# Patient Record
Sex: MALE | Race: WHITE | NOT HISPANIC OR LATINO | Employment: OTHER | URBAN - NONMETROPOLITAN AREA
[De-identification: names, ages, dates, MRNs, and addresses within clinical notes are randomized per-mention and may not be internally consistent; named-entity substitution may affect disease eponyms.]

---

## 2023-10-31 PROBLEM — Z87.891 FORMER SMOKER: Status: ACTIVE | Noted: 2023-10-31

## 2023-10-31 PROBLEM — E78.5 HYPERLIPIDEMIA: Status: ACTIVE | Noted: 2023-10-31

## 2023-10-31 PROBLEM — I10 BENIGN ESSENTIAL HYPERTENSION: Status: ACTIVE | Noted: 2023-10-31

## 2023-10-31 PROBLEM — E66.812 CLASS 2 OBESITY WITH BODY MASS INDEX (BMI) OF 37.0 TO 37.9 IN ADULT: Status: ACTIVE | Noted: 2023-10-31

## 2023-10-31 PROBLEM — I48.21 PERMANENT ATRIAL FIBRILLATION (MULTI): Status: ACTIVE | Noted: 2023-10-31

## 2023-10-31 PROBLEM — E66.9 CLASS 2 OBESITY WITH BODY MASS INDEX (BMI) OF 37.0 TO 37.9 IN ADULT: Status: ACTIVE | Noted: 2023-10-31

## 2023-10-31 PROBLEM — M06.9 RHEUMATOID ARTHRITIS (MULTI): Status: ACTIVE | Noted: 2023-10-31

## 2023-10-31 PROBLEM — Z79.899 HIGH RISK MEDICATION USE: Status: ACTIVE | Noted: 2023-10-31

## 2023-10-31 RX ORDER — OMEPRAZOLE 20 MG/1
20 CAPSULE, DELAYED RELEASE ORAL 2 TIMES DAILY
COMMUNITY

## 2023-10-31 RX ORDER — TIZANIDINE HYDROCHLORIDE 2 MG/1
20 CAPSULE, GELATIN COATED ORAL DAILY
COMMUNITY
End: 2024-05-15 | Stop reason: ALTCHOICE

## 2023-10-31 RX ORDER — ACETAMINOPHEN 500 MG
1 TABLET ORAL DAILY
COMMUNITY

## 2023-10-31 RX ORDER — VALSARTAN 160 MG/1
1 TABLET ORAL DAILY
COMMUNITY

## 2023-10-31 RX ORDER — CHOLECALCIFEROL (VITAMIN D3) 25 MCG
1 TABLET ORAL DAILY
COMMUNITY
End: 2024-05-15 | Stop reason: ALTCHOICE

## 2023-10-31 RX ORDER — ZINC GLUCONATE 50 MG
1 TABLET ORAL DAILY
COMMUNITY

## 2023-10-31 RX ORDER — ATORVASTATIN CALCIUM 20 MG/1
1 TABLET, FILM COATED ORAL NIGHTLY
COMMUNITY
Start: 2022-02-04

## 2023-10-31 RX ORDER — PREDNISONE 2.5 MG/1
2.5 TABLET ORAL DAILY
COMMUNITY

## 2023-10-31 RX ORDER — ALENDRONATE SODIUM 70 MG/1
1 TABLET ORAL
COMMUNITY
Start: 2022-06-27

## 2023-10-31 RX ORDER — SERTRALINE HYDROCHLORIDE 25 MG/1
1 TABLET, FILM COATED ORAL DAILY
COMMUNITY

## 2023-10-31 RX ORDER — METOPROLOL SUCCINATE 25 MG/1
1 TABLET, EXTENDED RELEASE ORAL DAILY
COMMUNITY
End: 2023-12-04 | Stop reason: DRUGHIGH

## 2023-10-31 RX ORDER — HYDROXYCHLOROQUINE SULFATE 200 MG/1
1 TABLET, FILM COATED ORAL DAILY
COMMUNITY
Start: 2022-06-29

## 2023-11-01 ENCOUNTER — OFFICE VISIT (OUTPATIENT)
Dept: CARDIOLOGY | Facility: CLINIC | Age: 88
End: 2023-11-01
Payer: MEDICARE

## 2023-11-01 VITALS
BODY MASS INDEX: 37.05 KG/M2 | HEART RATE: 81 BPM | HEIGHT: 64 IN | SYSTOLIC BLOOD PRESSURE: 122 MMHG | WEIGHT: 217 LBS | DIASTOLIC BLOOD PRESSURE: 70 MMHG

## 2023-11-01 DIAGNOSIS — Z79.899 HIGH RISK MEDICATION USE: ICD-10-CM

## 2023-11-01 DIAGNOSIS — E78.5 HYPERLIPIDEMIA, UNSPECIFIED HYPERLIPIDEMIA TYPE: ICD-10-CM

## 2023-11-01 DIAGNOSIS — I48.21 PERMANENT ATRIAL FIBRILLATION (MULTI): ICD-10-CM

## 2023-11-01 DIAGNOSIS — I10 BENIGN ESSENTIAL HYPERTENSION: ICD-10-CM

## 2023-11-01 PROCEDURE — 1159F MED LIST DOCD IN RCRD: CPT | Performed by: INTERNAL MEDICINE

## 2023-11-01 PROCEDURE — 1036F TOBACCO NON-USER: CPT | Performed by: INTERNAL MEDICINE

## 2023-11-01 PROCEDURE — 3078F DIAST BP <80 MM HG: CPT | Performed by: INTERNAL MEDICINE

## 2023-11-01 PROCEDURE — 3074F SYST BP LT 130 MM HG: CPT | Performed by: INTERNAL MEDICINE

## 2023-11-01 PROCEDURE — 99214 OFFICE O/P EST MOD 30 MIN: CPT | Performed by: INTERNAL MEDICINE

## 2023-11-01 NOTE — PROGRESS NOTES
"Subjective   Diego Munoz is a 88 y.o. male       Chief Complaint    Follow-up          HPI   Patient is in the office for follow-up for the problems noted below.  He has done well since last visit with no admission to the hospital and no symptoms of dyspnea palpitations syncope or near syncope.  He has significant limitation on the right arm due to previous shoulder surgery.  Lab data from August 2023 were reviewed and are very satisfactory.  Apart from the irregular rhythm and his obesity his physical examination was unremarkable.    ASSESSMENT AND PLAN:      1. Permanent atrial fibrillation managed with rate control anticoagulation and remains asymptomatic. We will continue anticoagulation with Eliquis, which he has tolerated. CBC is acceptable  2. Hyperlipidemia, on statin therapy. Presently under control. No side effect of statin, lipid profile from August 2023 was reviewed with the patient and his family  3.  Class II obesity. Encouraged the patient to watch his caloric consumption in order to bring his BMI down.  4. High-risk medication with anticoagulants without any bleeding complications  5. Hypertension, currently under control on valsartan  6. Rheumatoid arthritis on hydroxychloroquine and prednisone managed by rheumatology at the [facility]  .  Rossy Dickson MD, [facility]   Review of Systems   All other systems reviewed and are negative.         Visit Vitals  /70 (BP Location: Left arm, Patient Position: Sitting)   Pulse 81   Ht 1.626 m (5' 4\")   Wt 98.4 kg (217 lb)   BMI 37.25 kg/m²   Smoking Status Former   BSA 2.11 m²        Objective   Physical Exam  Constitutional:       Appearance: Normal appearance. He is normal weight.   HENT:      Nose: Nose normal.   Neck:      Vascular: No carotid bruit.   Cardiovascular:      Rate and Rhythm: Normal rate. Rhythm irregular.      Pulses: Normal pulses.      Heart sounds: Normal heart sounds.   Pulmonary:      Effort: Pulmonary effort is normal. "   Abdominal:      General: Bowel sounds are normal.      Palpations: Abdomen is soft.   Genitourinary:     Rectum: Normal.   Musculoskeletal:         General: Normal range of motion.      Cervical back: Normal range of motion.      Right lower leg: No edema.      Left lower leg: No edema.   Skin:     General: Skin is warm and dry.   Neurological:      General: No focal deficit present.      Mental Status: He is alert.   Psychiatric:         Mood and Affect: Mood normal.         Behavior: Behavior normal.         Thought Content: Thought content normal.         Judgment: Judgment normal.         Current Medications    Current Outpatient Medications:     acetaminophen (ARTHRITIS PAIN RELIEVER ORAL), Take 2 tablets by mouth every 6 hours if needed., Disp: , Rfl:     alendronate (Fosamax) 70 mg tablet, Take 1 tablet (70 mg) by mouth 1 (one) time per week.  ON AN EMPTY STOMACH WITH A GLASS OF WATER NO NOT LIE DOWN FOR 60 MINUTES, Disp: , Rfl:     apixaban (Eliquis) 5 mg tablet, Take 1 tablet (5 mg) by mouth 2 times a day., Disp: , Rfl:     atorvastatin (Lipitor) 20 mg tablet, Take 1 tablet (20 mg) by mouth once daily at bedtime., Disp: , Rfl:     cholecalciferol (Vitamin D-3) 25 MCG (1000 UT) tablet, Take 1 tablet (25 mcg) by mouth once daily., Disp: , Rfl:     cholecalciferol (Vitamin D-3) 5,000 Units tablet, Take 1 tablet (5,000 Units) by mouth once daily., Disp: , Rfl:     docusate sodium (STOOL SOFTENER ORAL), Take 1 tablet by mouth once daily. As directed, Disp: , Rfl:     hydroxychloroquine (Plaquenil) 200 mg tablet, Take 1 tablet (200 mg) by mouth once daily., Disp: , Rfl:     magnesium oxide 500 mg capsule, Take 1 capsule (500 mg) by mouth once daily., Disp: , Rfl:     metoprolol succinate XL (Toprol-XL) 25 mg 24 hr tablet, Take 1 tablet (25 mg) by mouth once daily., Disp: , Rfl:     NON FORMULARY, Take 2 each by mouth once daily. Eye Vitamins TABS, Disp: , Rfl:     omeprazole 20 mg tablet,disintegrat, delay rel,  Take 1 tablet by mouth 2 times a day., Disp: , Rfl:     predniSONE (Deltasone) 10 mg tablet, Take 2.5 mg by mouth once daily., Disp: , Rfl:     sertraline (Zoloft) 25 mg tablet, Take 1 tablet (25 mg) by mouth once daily. AS DIRECTED, Disp: , Rfl:     tiZANidine (Zanaflex) 2 mg capsule, Take 10 capsules (20 mg) by mouth once daily., Disp: , Rfl:     valsartan (Diovan) 160 mg tablet, Take 1 tablet (160 mg) by mouth once daily., Disp: , Rfl:     zinc acetate 50 mg (zinc) capsule, Take 1 capsule by mouth once daily., Disp: , Rfl:                      Assessment/Plan   1. Permanent atrial fibrillation (CMS/HCC)        2. Benign essential hypertension        3. High risk medication use        4. Hyperlipidemia, unspecified hyperlipidemia type

## 2023-11-01 NOTE — PATIENT INSTRUCTIONS
Please bring all medicines, vitamins, and herbal supplements with you when you come to the office.    Prescriptions will not be filled unless you are compliant with your follow up appointments or have a follow up appointment scheduled as per instruction of your physician. Refills should be requested at the time of your visit.     Follow up 6 months

## 2023-12-04 ENCOUNTER — TELEPHONE (OUTPATIENT)
Dept: CARDIOLOGY | Facility: CLINIC | Age: 88
End: 2023-12-04
Payer: MEDICARE

## 2023-12-04 RX ORDER — METOPROLOL SUCCINATE 50 MG/1
50 TABLET, EXTENDED RELEASE ORAL DAILY
COMMUNITY
End: 2023-12-06

## 2023-12-04 NOTE — TELEPHONE ENCOUNTER
Wife called into office that patient went to see PCP office and they adjusted his blood pressure medications. At their office was 148/96, so they doubled his Metoprolol up to 50 mg daily.  They did this on Friday and wife wanted office to be aware and get opinion. Believes that his BP was raised due to some orthopedic problems he is having from a fall. Yesterday at home readings 128/81 (with increased dosage).    To Dr. Rossy Dickson MD for review

## 2023-12-05 DIAGNOSIS — I48.21 PERMANENT ATRIAL FIBRILLATION (MULTI): ICD-10-CM

## 2023-12-05 DIAGNOSIS — I10 BENIGN ESSENTIAL HYPERTENSION: ICD-10-CM

## 2023-12-06 RX ORDER — METOPROLOL SUCCINATE 50 MG/1
50 TABLET, EXTENDED RELEASE ORAL DAILY
Qty: 90 TABLET | Refills: 3 | Status: SHIPPED | OUTPATIENT
Start: 2023-12-06

## 2024-04-23 DIAGNOSIS — I48.21 PERMANENT ATRIAL FIBRILLATION (MULTI): ICD-10-CM

## 2024-05-15 ENCOUNTER — OFFICE VISIT (OUTPATIENT)
Dept: CARDIOLOGY | Facility: CLINIC | Age: 89
End: 2024-05-15
Payer: MEDICARE

## 2024-05-15 VITALS
WEIGHT: 223 LBS | HEIGHT: 64 IN | BODY MASS INDEX: 38.07 KG/M2 | DIASTOLIC BLOOD PRESSURE: 80 MMHG | HEART RATE: 60 BPM | SYSTOLIC BLOOD PRESSURE: 132 MMHG

## 2024-05-15 DIAGNOSIS — I10 BENIGN ESSENTIAL HYPERTENSION: ICD-10-CM

## 2024-05-15 DIAGNOSIS — I48.21 PERMANENT ATRIAL FIBRILLATION (MULTI): ICD-10-CM

## 2024-05-15 DIAGNOSIS — E66.9 CLASS 2 OBESITY WITH BODY MASS INDEX (BMI) OF 38.0 TO 38.9 IN ADULT, UNSPECIFIED OBESITY TYPE, UNSPECIFIED WHETHER SERIOUS COMORBIDITY PRESENT: ICD-10-CM

## 2024-05-15 DIAGNOSIS — Z79.899 HIGH RISK MEDICATION USE: ICD-10-CM

## 2024-05-15 DIAGNOSIS — Z87.891 FORMER SMOKER: ICD-10-CM

## 2024-05-15 DIAGNOSIS — E78.5 HYPERLIPIDEMIA, UNSPECIFIED HYPERLIPIDEMIA TYPE: ICD-10-CM

## 2024-05-15 PROCEDURE — 1036F TOBACCO NON-USER: CPT | Performed by: INTERNAL MEDICINE

## 2024-05-15 PROCEDURE — 99214 OFFICE O/P EST MOD 30 MIN: CPT | Performed by: INTERNAL MEDICINE

## 2024-05-15 PROCEDURE — 3079F DIAST BP 80-89 MM HG: CPT | Performed by: INTERNAL MEDICINE

## 2024-05-15 PROCEDURE — 3075F SYST BP GE 130 - 139MM HG: CPT | Performed by: INTERNAL MEDICINE

## 2024-05-15 PROCEDURE — 1159F MED LIST DOCD IN RCRD: CPT | Performed by: INTERNAL MEDICINE

## 2024-05-15 NOTE — PATIENT INSTRUCTIONS
Please bring all medicines, vitamins, and herbal supplements with you when you come to the office.    Prescriptions will not be filled unless you are compliant with your follow up appointments or have a follow up appointment scheduled as per instruction of your physician. Refills should be requested at the time of your visit.     Fall Prevention Education Given     BMI was above normal measurement. Current weight: 101 kg (223 lb)  Weight change since last visit (-) denotes wt loss 6 lbs   Weight loss needed to achieve BMI 25: 77.7 Lbs  Weight loss needed to achieve BMI 30: 48.6 Lbs  Provided instructions on dietary changes.      Follow up 6 months  Same meds

## 2024-05-15 NOTE — LETTER
May 15, 2024     Evelia Puga DO  [address]  [place] [address]    Patient: Diego Munoz   YOB: 1935   Date of Visit: 5/15/2024       Dear Dr. Evelia Puga DO:    Thank you for referring Diego Munoz to me for evaluation. Below are my notes for this consultation.  If you have questions, please do not hesitate to call me. I look forward to following your patient along with you.       Sincerely,     Rossy Dickson MD      CC: No Recipients  ______________________________________________________________________________________    Subjective   Diego Munoz is a 88 y.o. male       Chief Complaint    Follow-up          HPI   Patient is in the office for follow-up for permanent atrial fibrillation accompanied by his wife.  He denies any symptoms of palpitations dyspnea chest pain syncope or near syncope and has had no major bleeds on apixaban except for intermittent nosebleed due to dryness and also ecchymosis from working around the house.  His pressure is under control.  He maintains active lifestyle.  His recent lab data were reviewed and his numbers look excellent.  Apart from the irregular rhythm and class II obesity examination was unremarkable.    ASSESSMENT AND PLAN:      1. Permanent atrial fibrillation managed with rate control anticoagulation and remains asymptomatic. We will continue anticoagulation with Eliquis, which he has tolerated. CBC is acceptable, renal function is normal  2. Hyperlipidemia, on statin therapy. Presently under control.  Recent lab data were reviewed and his numbers look on target  3.  Class II obesity. Encouraged the patient to watch his caloric consumption in order to bring his BMI down.  4. High-risk medication with anticoagulants without any major bleeding complications, he has intermittent ecchymosis easy to manage and requiring no packing  5. Hypertension, currently under control on valsartan, renal function is normal  6.  "Rheumatoid arthritis on hydroxychloroquine and prednisone managed by rheumatology at the [facility]  .  Rossy Dickson MD, [facility]   Review of Systems   All other systems reviewed and are negative.           Vitals:    05/15/24 0916   BP: 132/80   BP Location: Right arm   Patient Position: Sitting   Pulse: 60   Weight: 101 kg (223 lb)   Height: 1.626 m (5' 4\")        Objective   Physical Exam  Constitutional:       Appearance: Normal appearance.   HENT:      Nose: Nose normal.   Neck:      Vascular: No carotid bruit.   Cardiovascular:      Rate and Rhythm: Normal rate. Rhythm irregular.      Pulses: Normal pulses.      Heart sounds: Murmur heard.      Systolic murmur is present with a grade of 1/6.   Pulmonary:      Effort: Pulmonary effort is normal.   Abdominal:      General: Bowel sounds are normal.      Palpations: Abdomen is soft.   Musculoskeletal:         General: Normal range of motion.      Cervical back: Normal range of motion.      Right lower leg: No edema.      Left lower leg: No edema.   Skin:     General: Skin is warm and dry.   Neurological:      General: No focal deficit present.      Mental Status: He is alert.   Psychiatric:         Mood and Affect: Mood normal.         Behavior: Behavior normal.         Thought Content: Thought content normal.         Judgment: Judgment normal.         Allergies  Nitroglycerin     Current Medications    Current Outpatient Medications:   •  acetaminophen (ARTHRITIS PAIN RELIEVER ORAL), Take 2 tablets by mouth every 6 hours if needed., Disp: , Rfl:   •  alendronate (Fosamax) 70 mg tablet, Take 1 tablet (70 mg) by mouth 1 (one) time per week.  ON AN EMPTY STOMACH WITH A GLASS OF WATER NO NOT LIE DOWN FOR 60 MINUTES, Disp: , Rfl:   •  apixaban (Eliquis) 5 mg tablet, Take 1 tablet (5 mg) by mouth 2 times a day., Disp: 180 tablet, Rfl: 3  •  atorvastatin (Lipitor) 20 mg tablet, Take 1 tablet (20 mg) by mouth once daily at bedtime., Disp: , Rfl:   •  " cholecalciferol (Vitamin D-3) 5,000 Units tablet, Take 1 tablet (5,000 Units) by mouth once daily., Disp: , Rfl:   •  docusate sodium (STOOL SOFTENER ORAL), Take 1 tablet by mouth once daily. As directed, Disp: , Rfl:   •  hydroxychloroquine (Plaquenil) 200 mg tablet, Take 1 tablet (200 mg) by mouth once daily., Disp: , Rfl:   •  magnesium oxide 500 mg capsule, Take 1 capsule (500 mg) by mouth once daily., Disp: , Rfl:   •  metoprolol succinate XL (Toprol-XL) 50 mg 24 hr tablet, Take 1 tablet (50 mg) by mouth once daily., Disp: 90 tablet, Rfl: 3  •  NON FORMULARY, Take 2 each by mouth once daily. Eye Vitamins TABS, Disp: , Rfl:   •  omeprazole (PriLOSEC) 20 mg DR capsule, Take 1 capsule (20 mg) by mouth 2 times a day., Disp: , Rfl:   •  predniSONE (Deltasone) 2.5 mg tablet, Take 1 tablet (2.5 mg) by mouth once daily., Disp: , Rfl:   •  sertraline (Zoloft) 25 mg tablet, Take 1 tablet (25 mg) by mouth once daily. AS DIRECTED, Disp: , Rfl:   •  valsartan (Diovan) 160 mg tablet, Take 1 tablet (160 mg) by mouth once daily., Disp: , Rfl:   •  zinc acetate 50 mg (zinc) capsule, Take 1 capsule by mouth once daily., Disp: , Rfl:                      Assessment/Plan   1. Permanent atrial fibrillation (Multi)  Follow Up In Cardiology    Follow Up In Cardiology      2. Benign essential hypertension  Follow Up In Cardiology      3. Hyperlipidemia, unspecified hyperlipidemia type        4. High risk medication use        5. Former smoker        6. Class 2 obesity with body mass index (BMI) of 38.0 to 38.9 in adult, unspecified obesity type, unspecified whether serious comorbidity present                 Scribe Attestation  By signing my name below, Leticia SINGER LPN, Scribe   attest that this documentation has been prepared under the direction and in the presence of Rossy Dickson MD.     Provider Attestation - Scribe documentation    All medical record entries made by the Scribe were at my direction and personally dictated by  me. I have reviewed the chart and agree that the record accurately reflects my personal performance of the history, physical exam, discussion and plan.

## 2024-05-15 NOTE — PROGRESS NOTES
"Subjective   Diego Munoz is a 88 y.o. male       Chief Complaint    Follow-up          HPI   Patient is in the office for follow-up for permanent atrial fibrillation accompanied by his wife.  He denies any symptoms of palpitations dyspnea chest pain syncope or near syncope and has had no major bleeds on apixaban except for intermittent nosebleed due to dryness and also ecchymosis from working around the house.  His pressure is under control.  He maintains active lifestyle.  His recent lab data were reviewed and his numbers look excellent.  Apart from the irregular rhythm and class II obesity examination was unremarkable.    ASSESSMENT AND PLAN:      1. Permanent atrial fibrillation managed with rate control anticoagulation and remains asymptomatic. We will continue anticoagulation with Eliquis, which he has tolerated. CBC is acceptable, renal function is normal  2. Hyperlipidemia, on statin therapy. Presently under control.  Recent lab data were reviewed and his numbers look on target  3.  Class II obesity. Encouraged the patient to watch his caloric consumption in order to bring his BMI down.  4. High-risk medication with anticoagulants without any major bleeding complications, he has intermittent ecchymosis easy to manage and requiring no packing  5. Hypertension, currently under control on valsartan, renal function is normal  6. Rheumatoid arthritis on hydroxychloroquine and prednisone managed by rheumatology at the [facility]  .  [facility] Dickson MD, [facility]   Review of Systems   All other systems reviewed and are negative.           Vitals:    05/15/24 0916   BP: 132/80   BP Location: Right arm   Patient Position: Sitting   Pulse: 60   Weight: 101 kg (223 lb)   Height: 1.626 m (5' 4\")        Objective   Physical Exam  Constitutional:       Appearance: Normal appearance.   HENT:      Nose: Nose normal.   Neck:      Vascular: No carotid bruit.   Cardiovascular:      Rate and Rhythm: Normal rate. Rhythm " irregular.      Pulses: Normal pulses.      Heart sounds: Murmur heard.      Systolic murmur is present with a grade of 1/6.   Pulmonary:      Effort: Pulmonary effort is normal.   Abdominal:      General: Bowel sounds are normal.      Palpations: Abdomen is soft.   Musculoskeletal:         General: Normal range of motion.      Cervical back: Normal range of motion.      Right lower leg: No edema.      Left lower leg: No edema.   Skin:     General: Skin is warm and dry.   Neurological:      General: No focal deficit present.      Mental Status: He is alert.   Psychiatric:         Mood and Affect: Mood normal.         Behavior: Behavior normal.         Thought Content: Thought content normal.         Judgment: Judgment normal.         Allergies  Nitroglycerin     Current Medications    Current Outpatient Medications:     acetaminophen (ARTHRITIS PAIN RELIEVER ORAL), Take 2 tablets by mouth every 6 hours if needed., Disp: , Rfl:     alendronate (Fosamax) 70 mg tablet, Take 1 tablet (70 mg) by mouth 1 (one) time per week.  ON AN EMPTY STOMACH WITH A GLASS OF WATER NO NOT LIE DOWN FOR 60 MINUTES, Disp: , Rfl:     apixaban (Eliquis) 5 mg tablet, Take 1 tablet (5 mg) by mouth 2 times a day., Disp: 180 tablet, Rfl: 3    atorvastatin (Lipitor) 20 mg tablet, Take 1 tablet (20 mg) by mouth once daily at bedtime., Disp: , Rfl:     cholecalciferol (Vitamin D-3) 5,000 Units tablet, Take 1 tablet (5,000 Units) by mouth once daily., Disp: , Rfl:     docusate sodium (STOOL SOFTENER ORAL), Take 1 tablet by mouth once daily. As directed, Disp: , Rfl:     hydroxychloroquine (Plaquenil) 200 mg tablet, Take 1 tablet (200 mg) by mouth once daily., Disp: , Rfl:     magnesium oxide 500 mg capsule, Take 1 capsule (500 mg) by mouth once daily., Disp: , Rfl:     metoprolol succinate XL (Toprol-XL) 50 mg 24 hr tablet, Take 1 tablet (50 mg) by mouth once daily., Disp: 90 tablet, Rfl: 3    NON FORMULARY, Take 2 each by mouth once daily. Eye  Vitamins TABS, Disp: , Rfl:     omeprazole (PriLOSEC) 20 mg DR capsule, Take 1 capsule (20 mg) by mouth 2 times a day., Disp: , Rfl:     predniSONE (Deltasone) 2.5 mg tablet, Take 1 tablet (2.5 mg) by mouth once daily., Disp: , Rfl:     sertraline (Zoloft) 25 mg tablet, Take 1 tablet (25 mg) by mouth once daily. AS DIRECTED, Disp: , Rfl:     valsartan (Diovan) 160 mg tablet, Take 1 tablet (160 mg) by mouth once daily., Disp: , Rfl:     zinc acetate 50 mg (zinc) capsule, Take 1 capsule by mouth once daily., Disp: , Rfl:                      Assessment/Plan   1. Permanent atrial fibrillation (Multi)  Follow Up In Cardiology    Follow Up In Cardiology      2. Benign essential hypertension  Follow Up In Cardiology      3. Hyperlipidemia, unspecified hyperlipidemia type        4. High risk medication use        5. Former smoker        6. Class 2 obesity with body mass index (BMI) of 38.0 to 38.9 in adult, unspecified obesity type, unspecified whether serious comorbidity present                 Scribe Attestation  By signing my name below, Leticia SINGER LPN, Scribe   attest that this documentation has been prepared under the direction and in the presence of Rossy Dickson MD.     Provider Attestation - Scribe documentation    All medical record entries made by the Scribe were at my direction and personally dictated by me. I have reviewed the chart and agree that the record accurately reflects my personal performance of the history, physical exam, discussion and plan.

## 2024-11-01 ENCOUNTER — APPOINTMENT (OUTPATIENT)
Dept: CARDIOLOGY | Facility: CLINIC | Age: 89
End: 2024-11-01
Payer: MEDICARE

## 2024-11-01 VITALS
BODY MASS INDEX: 38 KG/M2 | DIASTOLIC BLOOD PRESSURE: 76 MMHG | HEART RATE: 62 BPM | HEIGHT: 64 IN | SYSTOLIC BLOOD PRESSURE: 118 MMHG | WEIGHT: 222.6 LBS

## 2024-11-01 DIAGNOSIS — R01.1 MURMUR, HEART: ICD-10-CM

## 2024-11-01 DIAGNOSIS — I35.0 NONRHEUMATIC AORTIC VALVE STENOSIS: ICD-10-CM

## 2024-11-01 DIAGNOSIS — Z87.891 FORMER SMOKER: ICD-10-CM

## 2024-11-01 DIAGNOSIS — E66.812 CLASS 2 OBESITY: ICD-10-CM

## 2024-11-01 DIAGNOSIS — E78.5 HYPERLIPIDEMIA, UNSPECIFIED HYPERLIPIDEMIA TYPE: ICD-10-CM

## 2024-11-01 DIAGNOSIS — I10 BENIGN ESSENTIAL HYPERTENSION: ICD-10-CM

## 2024-11-01 DIAGNOSIS — Z79.899 HIGH RISK MEDICATION USE: ICD-10-CM

## 2024-11-01 DIAGNOSIS — I48.21 PERMANENT ATRIAL FIBRILLATION (MULTI): Primary | ICD-10-CM

## 2024-11-01 PROCEDURE — 1159F MED LIST DOCD IN RCRD: CPT | Performed by: INTERNAL MEDICINE

## 2024-11-01 PROCEDURE — 99214 OFFICE O/P EST MOD 30 MIN: CPT | Performed by: INTERNAL MEDICINE

## 2024-11-01 PROCEDURE — 3074F SYST BP LT 130 MM HG: CPT | Performed by: INTERNAL MEDICINE

## 2024-11-01 PROCEDURE — 3078F DIAST BP <80 MM HG: CPT | Performed by: INTERNAL MEDICINE

## 2024-11-01 PROCEDURE — 1036F TOBACCO NON-USER: CPT | Performed by: INTERNAL MEDICINE

## 2024-11-16 ENCOUNTER — HOSPITAL ENCOUNTER (OUTPATIENT)
Dept: CARDIOLOGY | Facility: CLINIC | Age: 89
Discharge: HOME | End: 2024-11-16
Payer: MEDICARE

## 2024-11-16 VITALS
SYSTOLIC BLOOD PRESSURE: 126 MMHG | HEIGHT: 64 IN | WEIGHT: 222 LBS | DIASTOLIC BLOOD PRESSURE: 76 MMHG | BODY MASS INDEX: 37.9 KG/M2

## 2024-11-16 DIAGNOSIS — R01.1 MURMUR, HEART: ICD-10-CM

## 2024-11-16 LAB — BODY SURFACE AREA: 2.13 M2

## 2024-11-16 PROCEDURE — 93306 TTE W/DOPPLER COMPLETE: CPT | Performed by: INTERNAL MEDICINE

## 2024-11-16 PROCEDURE — 93306 TTE W/DOPPLER COMPLETE: CPT

## 2024-11-21 ENCOUNTER — TELEPHONE (OUTPATIENT)
Dept: CARDIOLOGY | Facility: CLINIC | Age: 89
End: 2024-11-21
Payer: MEDICARE

## 2024-11-21 LAB
AORTIC VALVE MEAN GRADIENT: 10 MMHG
AORTIC VALVE PEAK VELOCITY: 2.34 M/S
AV PEAK GRADIENT: 22 MMHG
AVA (PEAK VEL): 1.6 CM2
AVA (VTI): 1.56 CM2
EJECTION FRACTION: 63 %
LEFT VENTRICLE INTERNAL DIMENSION DIASTOLE: 4.82 CM (ref 3.5–6)
LEFT VENTRICULAR OUTFLOW TRACT DIAMETER: 2.6 CM
RIGHT VENTRICLE PEAK SYSTOLIC PRESSURE: 50.6 MMHG

## 2024-11-21 NOTE — TELEPHONE ENCOUNTER
----- Message from Rossy Dickson sent at 11/21/2024  1:21 PM EST -----  Let him know the cardiac murmur noted during the office visit is nothing to worry about  ----- Message -----  From: Latosha Syngo - Cardiology Results In  Sent: 11/21/2024  12:49 PM EST  To: Rossy Dickson MD

## 2025-01-02 DIAGNOSIS — I10 BENIGN ESSENTIAL HYPERTENSION: ICD-10-CM

## 2025-01-02 DIAGNOSIS — I48.21 PERMANENT ATRIAL FIBRILLATION (MULTI): ICD-10-CM

## 2025-01-02 DIAGNOSIS — E78.5 HYPERLIPIDEMIA, UNSPECIFIED HYPERLIPIDEMIA TYPE: ICD-10-CM

## 2025-01-02 RX ORDER — VALSARTAN 160 MG/1
160 TABLET ORAL DAILY
Qty: 90 TABLET | Refills: 3 | Status: SHIPPED | OUTPATIENT
Start: 2025-01-02 | End: 2026-01-02

## 2025-01-02 RX ORDER — ATORVASTATIN CALCIUM 20 MG/1
20 TABLET, FILM COATED ORAL NIGHTLY
Qty: 90 TABLET | Refills: 3 | Status: SHIPPED | OUTPATIENT
Start: 2025-01-02 | End: 2026-01-02

## 2025-01-02 RX ORDER — METOPROLOL SUCCINATE 50 MG/1
50 TABLET, EXTENDED RELEASE ORAL DAILY
Qty: 90 TABLET | Refills: 3 | Status: SHIPPED | OUTPATIENT
Start: 2025-01-02

## 2025-07-14 ENCOUNTER — TELEPHONE (OUTPATIENT)
Dept: CARDIOLOGY | Facility: CLINIC | Age: OVER 89
End: 2025-07-14
Payer: MEDICARE

## 2025-07-14 NOTE — TELEPHONE ENCOUNTER
Spouse phones and states that patient has been experiencing loose stools. They are inquiring if magnesium 400 mg is too high of a dose or if there is an alternate recommendation. Please advise

## 2025-07-15 NOTE — TELEPHONE ENCOUNTER
Attempted to phone spouse, left detailed message with information and instructions to return call.

## 2025-08-08 ENCOUNTER — APPOINTMENT (OUTPATIENT)
Dept: CARDIOLOGY | Facility: CLINIC | Age: OVER 89
End: 2025-08-08
Payer: MEDICARE

## 2025-08-08 VITALS
WEIGHT: 232.8 LBS | HEART RATE: 76 BPM | HEIGHT: 64 IN | SYSTOLIC BLOOD PRESSURE: 126 MMHG | BODY MASS INDEX: 39.75 KG/M2 | DIASTOLIC BLOOD PRESSURE: 86 MMHG

## 2025-08-08 DIAGNOSIS — I48.21 PERMANENT ATRIAL FIBRILLATION (MULTI): Primary | ICD-10-CM

## 2025-08-08 DIAGNOSIS — D64.9 NORMOCYTIC ANEMIA: ICD-10-CM

## 2025-08-08 DIAGNOSIS — Z79.899 HIGH RISK MEDICATION USE: ICD-10-CM

## 2025-08-08 DIAGNOSIS — Z87.891 FORMER SMOKER: ICD-10-CM

## 2025-08-08 DIAGNOSIS — I10 BENIGN ESSENTIAL HYPERTENSION: ICD-10-CM

## 2025-08-08 DIAGNOSIS — E87.1 HYPONATREMIA: ICD-10-CM

## 2025-08-08 DIAGNOSIS — E66.9 OBESITY (BMI 30-39.9): ICD-10-CM

## 2025-08-08 DIAGNOSIS — M06.9 RHEUMATOID ARTHRITIS, INVOLVING UNSPECIFIED SITE, UNSPECIFIED WHETHER RHEUMATOID FACTOR PRESENT (MULTI): ICD-10-CM

## 2025-08-08 DIAGNOSIS — E78.5 HYPERLIPIDEMIA, UNSPECIFIED HYPERLIPIDEMIA TYPE: ICD-10-CM

## 2025-08-08 PROBLEM — E66.812 CLASS 2 OBESITY WITH BODY MASS INDEX (BMI) OF 38.0 TO 38.9 IN ADULT: Status: RESOLVED | Noted: 2023-10-31 | Resolved: 2025-08-08

## 2025-08-08 LAB
NON-UH HIE AGAP: 11 MEQ/L (ref 6–16)
NON-UH HIE BUN/CREAT RATIO: 21 NO UNITS (ref 10–20)
NON-UH HIE BUN: 19 MG/DL (ref 5–21)
NON-UH HIE CALCIUM LVL: 9.3 MG/DL (ref 8.9–11.1)
NON-UH HIE CHLORIDE: 98 MMOL/L (ref 101–111)
NON-UH HIE CO2: 28 MMOL/L (ref 21–31)
NON-UH HIE CREATININE: 0.9 MG/DL (ref 0.5–1.3)
NON-UH HIE EGFR: 81 ML/MIN/1.73 M2
NON-UH HIE GLUCOSE LVL: 100 MG/DL (ref 55–199)
NON-UH HIE POTASSIUM LVL: 4.3 MMOL/L (ref 3.5–5.3)
NON-UH HIE SODIUM LVL: 133 MMOL/L (ref 135–145)

## 2025-08-08 PROCEDURE — 1159F MED LIST DOCD IN RCRD: CPT | Performed by: INTERNAL MEDICINE

## 2025-08-08 PROCEDURE — 3074F SYST BP LT 130 MM HG: CPT | Performed by: INTERNAL MEDICINE

## 2025-08-08 PROCEDURE — G2211 COMPLEX E/M VISIT ADD ON: HCPCS | Performed by: INTERNAL MEDICINE

## 2025-08-08 PROCEDURE — 3079F DIAST BP 80-89 MM HG: CPT | Performed by: INTERNAL MEDICINE

## 2025-08-08 PROCEDURE — 99214 OFFICE O/P EST MOD 30 MIN: CPT | Performed by: INTERNAL MEDICINE

## 2025-08-08 RX ORDER — FERROUS SULFATE 325(65) MG
325 TABLET ORAL
COMMUNITY
Start: 2025-04-01

## 2025-08-08 RX ORDER — PREDNISONE 2.5 MG/1
2.5 TABLET ORAL
COMMUNITY
Start: 2025-01-29

## 2025-08-08 NOTE — PATIENT INSTRUCTIONS
Please bring all medicines, vitamins, and herbal supplements with you when you come to the office.    Prescriptions will not be filled unless you are compliant with your follow up appointments or have a follow up appointment scheduled as per instruction of your physician. Refills should be requested at the time of your visit. BMI was above normal measurement. Current weight: 106 kg (232 lb 12.8 oz)  Weight change since last visit (-) denotes wt loss 10.8 lbs   Weight loss needed to achieve BMI 25: 87.5 Lbs  Weight loss needed to achieve BMI 30: 58.4 Lbs  Provided instructions on dietary changes.

## 2025-08-08 NOTE — PROGRESS NOTES
HPI  Patient is in the office today accompanied by his daughter for follow-up for permanent atrial fibrillation along with hypertension hyperlipidemia and mild aortic stenosis.  Since he was last seen in the office his weight has gone up by 10 pounds due to inactivity's and normal eating habits.  This issue was tackled and addressed at length with the patient and his daughter.  He had an echocardiogram since he was last seen in the office which revealed normal ejection fraction and mild aortic stenosis unchanged from previous echo in 2021.  His most recent lab data covering comprehensive metabolic profile CBC and lipid profile in March 2025 was reviewed, his sodium is low at 131 mmol/L.  Renal function is normal.  CBC revealed mild anemia with normal MCV.  He is on iron supplement.  No active bleeding has been encountered.  Apart from the irregular rhythm, aortic stenosis murmur and his obesity physical examination was normal.    ASSESSMENT AND PLAN:      1. Permanent atrial fibrillation managed with rate control anticoagulation and remains asymptomatic. We will continue anticoagulation with Eliquis, which he has tolerated.  CBC and renal functions are normal  2. Hyperlipidemia, on statin therapy. Presently under control.  March 2025 lab data were reviewed and his numbers look on target  3.  Class II obesity.  His weight has increased 10 pounds from last visit due to inactivity and normal eating habits.  Encouraged the patient to watch his caloric consumption in order to bring his BMI down.  4. High-risk medication with anticoagulants using Eliquis without any major bleeding complications but with diffuse ecchymosis  5.  Essential hypertension, currently under control on valsartan, renal function is normal  6. Rheumatoid arthritis on hydroxychloroquine and prednisone managed by rheumatology at the [facility]  7.  Mild aortic stenosis, last echo December 2024.,  Will monitor clinically and with echoes when  "necessary.  8.  Hyponatremia which is chronic.  Valsartan is contributing factor.  Will follow basic metabolic profile to assure no further drop in his sodium level.  ROS     Review of system essentially normal    Vitals:    08/08/25 0935   BP: 126/86   BP Location: Right arm   Patient Position: Sitting   Pulse: 76   Weight: 106 kg (232 lb 12.8 oz)   Height: 1.626 m (5' 4\")        Objective   Physical Exam  Constitutional:       Appearance: Normal appearance.   HENT:      Nose: Nose normal.   Neck:      Vascular: No carotid bruit.     Cardiovascular:      Rate and Rhythm: Normal rate.      Pulses: Normal pulses.      Heart sounds: Murmur heard.      Comments: 1/6 systolic murmur  Pulmonary:      Effort: Pulmonary effort is normal.   Abdominal:      General: Bowel sounds are normal.      Palpations: Abdomen is soft.     Musculoskeletal:         General: Normal range of motion.      Cervical back: Normal range of motion.      Right lower leg: No edema.      Left lower leg: No edema.     Skin:     General: Skin is warm and dry.     Neurological:      General: No focal deficit present.      Mental Status: He is alert.     Psychiatric:         Mood and Affect: Mood normal.         Behavior: Behavior normal.         Thought Content: Thought content normal.         Judgment: Judgment normal.         Allergies  Nitroglycerin     Current Medications  Current Outpatient Medications   Medication Instructions    acetaminophen (ARTHRITIS PAIN RELIEVER ORAL) 2 tablets, Every 6 hours PRN    alendronate (Fosamax) 70 mg tablet 1 tablet, Once Weekly    apixaban (ELIQUIS) 5 mg, oral, 2 times daily    atorvastatin (LIPITOR) 20 mg, oral, Nightly    cholecalciferol (Vitamin D-3) 5,000 Units tablet 1 tablet, Daily    ferrous sulfate 325 mg (65 mg elemental) tablet 325 mg of ferrous sulfate, Daily with breakfast    hydroxychloroquine (Plaquenil) 200 mg tablet 1 tablet, Daily    metoprolol succinate XL (TOPROL-XL) 50 mg, oral, Daily    " omeprazole (PRILOSEC) 20 mg, 2 times daily    predniSONE (DELTASONE) 2.5 mg, Daily RT    sertraline (Zoloft) 25 mg tablet 1 tablet, Daily    valsartan (DIOVAN) 160 mg, oral, Daily    vit C/E/Zn/coppr/lutein/zeaxan (PRESERVISION AREDS-2 ORAL) 1 tablet, Daily    zinc acetate 50 mg (zinc) capsule 1 capsule, Daily                        Assessment/Plan   1. Permanent atrial fibrillation (Multi)  Follow Up In Cardiology    Follow Up In Cardiology    Basic Metabolic Panel    Basic Metabolic Panel      2. Benign essential hypertension  Basic Metabolic Panel    Basic Metabolic Panel      3. Hyperlipidemia, unspecified hyperlipidemia type        4. Rheumatoid arthritis, involving unspecified site, unspecified whether rheumatoid factor present (Multi)  Basic Metabolic Panel    Basic Metabolic Panel      5. High risk medication use  Basic Metabolic Panel    Basic Metabolic Panel      6. Obesity (BMI 30-39.9)        7. Former smoker                 Scribe Attestation  By signing my name below, IShyann LPN Scribe   attest that this documentation has been prepared under the direction and in the presence of Rossy Dickson MD.     Provider Attestation - Scribe documentation    All medical record entries made by the Scribe were at my direction and personally dictated by me. I have reviewed the chart and agree that the record accurately reflects my personal performance of the history, physical exam, discussion and plan.

## 2025-08-08 NOTE — LETTER
August 8, 2025     Chaya Tavera, APRN-CNP  [address] [place] [address]    Patient: Diego Munoz   YOB: 1935   Date of Visit: 8/8/2025       Dear Dr. Chaya Tavera, APRN-CNP:    Thank you for referring Diego Munoz to me for evaluation. Below are my notes for this consultation.  If you have questions, please do not hesitate to call me. I look forward to following your patient along with you.       Sincerely,     Rossy Dickson MD      CC: No Recipients  ______________________________________________________________________________________    HPI  Patient is in the office today accompanied by his daughter for follow-up for permanent atrial fibrillation along with hypertension hyperlipidemia and mild aortic stenosis.  Since he was last seen in the office his weight has gone up by 10 pounds due to inactivity's and normal eating habits.  This issue was tackled and addressed at length with the patient and his daughter.  He had an echocardiogram since he was last seen in the office which revealed normal ejection fraction and mild aortic stenosis unchanged from previous echo in 2021.  His most recent lab data covering comprehensive metabolic profile CBC and lipid profile in March 2025 was reviewed, his sodium is low at 131 mmol/L.  Renal function is normal.  CBC revealed mild anemia with normal MCV.  He is on iron supplement.  No active bleeding has been encountered.  Apart from the irregular rhythm, aortic stenosis murmur and his obesity physical examination was normal.    ASSESSMENT AND PLAN:      1. Permanent atrial fibrillation managed with rate control anticoagulation and remains asymptomatic. We will continue anticoagulation with Eliquis, which he has tolerated.  CBC and renal functions are normal  2. Hyperlipidemia, on statin therapy. Presently under control.  March 2025 lab data were reviewed and his numbers look on target  3.  Class II obesity.  His weight has increased 10 pounds  "from last visit due to inactivity and normal eating habits.  Encouraged the patient to watch his caloric consumption in order to bring his BMI down.  4. High-risk medication with anticoagulants using Eliquis without any major bleeding complications but with diffuse ecchymosis  5.  Essential hypertension, currently under control on valsartan, renal function is normal  6. Rheumatoid arthritis on hydroxychloroquine and prednisone managed by rheumatology at the [facility]  7.  Mild aortic stenosis, last echo December 2024.,  Will monitor clinically and with echoes when necessary.  8.  Hyponatremia which is chronic.  Valsartan is contributing factor.  Will follow basic metabolic profile to assure no further drop in his sodium level.  ROS     Review of system essentially normal    Vitals:    08/08/25 0935   BP: 126/86   BP Location: Right arm   Patient Position: Sitting   Pulse: 76   Weight: 106 kg (232 lb 12.8 oz)   Height: 1.626 m (5' 4\")        Objective   Physical Exam  Constitutional:       Appearance: Normal appearance.   HENT:      Nose: Nose normal.   Neck:      Vascular: No carotid bruit.     Cardiovascular:      Rate and Rhythm: Normal rate.      Pulses: Normal pulses.      Heart sounds: Murmur heard.      Comments: 1/6 systolic murmur  Pulmonary:      Effort: Pulmonary effort is normal.   Abdominal:      General: Bowel sounds are normal.      Palpations: Abdomen is soft.     Musculoskeletal:         General: Normal range of motion.      Cervical back: Normal range of motion.      Right lower leg: No edema.      Left lower leg: No edema.     Skin:     General: Skin is warm and dry.     Neurological:      General: No focal deficit present.      Mental Status: He is alert.     Psychiatric:         Mood and Affect: Mood normal.         Behavior: Behavior normal.         Thought Content: Thought content normal.         Judgment: Judgment normal.         Allergies  Nitroglycerin     Current Medications  Current " Outpatient Medications   Medication Instructions   • acetaminophen (ARTHRITIS PAIN RELIEVER ORAL) 2 tablets, Every 6 hours PRN   • alendronate (Fosamax) 70 mg tablet 1 tablet, Once Weekly   • apixaban (ELIQUIS) 5 mg, oral, 2 times daily   • atorvastatin (LIPITOR) 20 mg, oral, Nightly   • cholecalciferol (Vitamin D-3) 5,000 Units tablet 1 tablet, Daily   • ferrous sulfate 325 mg (65 mg elemental) tablet 325 mg of ferrous sulfate, Daily with breakfast   • hydroxychloroquine (Plaquenil) 200 mg tablet 1 tablet, Daily   • metoprolol succinate XL (TOPROL-XL) 50 mg, oral, Daily   • omeprazole (PRILOSEC) 20 mg, 2 times daily   • predniSONE (DELTASONE) 2.5 mg, Daily RT   • sertraline (Zoloft) 25 mg tablet 1 tablet, Daily   • valsartan (DIOVAN) 160 mg, oral, Daily   • vit C/E/Zn/coppr/lutein/zeaxan (PRESERVISION AREDS-2 ORAL) 1 tablet, Daily   • zinc acetate 50 mg (zinc) capsule 1 capsule, Daily                        Assessment/Plan   1. Permanent atrial fibrillation (Multi)  Follow Up In Cardiology    Follow Up In Cardiology    Basic Metabolic Panel    Basic Metabolic Panel      2. Benign essential hypertension  Basic Metabolic Panel    Basic Metabolic Panel      3. Hyperlipidemia, unspecified hyperlipidemia type        4. Rheumatoid arthritis, involving unspecified site, unspecified whether rheumatoid factor present (Multi)  Basic Metabolic Panel    Basic Metabolic Panel      5. High risk medication use  Basic Metabolic Panel    Basic Metabolic Panel      6. Obesity (BMI 30-39.9)        7. Former smoker                 Scribe Attestation  By signing my name below, I, Lino Larkin LPN   attest that this documentation has been prepared under the direction and in the presence of Rossy Dickson MD.     Provider Attestation - Scribe documentation    All medical record entries made by the Scribe were at my direction and personally dictated by me. I have reviewed the chart and agree that the record accurately reflects  my personal performance of the history, physical exam, discussion and plan.

## 2025-08-12 ENCOUNTER — RESULTS FOLLOW-UP (OUTPATIENT)
Dept: CARDIOLOGY | Facility: CLINIC | Age: OVER 89
End: 2025-08-12
Payer: MEDICARE

## 2026-03-20 ENCOUNTER — APPOINTMENT (OUTPATIENT)
Dept: CARDIOLOGY | Facility: CLINIC | Age: OVER 89
End: 2026-03-20
Payer: MEDICARE